# Patient Record
Sex: MALE | Race: WHITE | NOT HISPANIC OR LATINO
[De-identification: names, ages, dates, MRNs, and addresses within clinical notes are randomized per-mention and may not be internally consistent; named-entity substitution may affect disease eponyms.]

---

## 2019-07-08 PROBLEM — Z00.00 ENCOUNTER FOR PREVENTIVE HEALTH EXAMINATION: Status: ACTIVE | Noted: 2019-07-08

## 2019-07-09 ENCOUNTER — APPOINTMENT (OUTPATIENT)
Dept: INTERNAL MEDICINE | Facility: CLINIC | Age: 69
End: 2019-07-09
Payer: MEDICARE

## 2019-07-09 VITALS
HEIGHT: 69.5 IN | WEIGHT: 168 LBS | OXYGEN SATURATION: 98 % | BODY MASS INDEX: 24.32 KG/M2 | SYSTOLIC BLOOD PRESSURE: 100 MMHG | HEART RATE: 77 BPM | DIASTOLIC BLOOD PRESSURE: 70 MMHG

## 2019-07-09 DIAGNOSIS — Z87.891 PERSONAL HISTORY OF NICOTINE DEPENDENCE: ICD-10-CM

## 2019-07-09 PROCEDURE — 99203 OFFICE O/P NEW LOW 30 MIN: CPT

## 2019-07-09 RX ORDER — ASPIRIN 81 MG
81 TABLET, DELAYED RELEASE (ENTERIC COATED) ORAL
Refills: 0 | Status: ACTIVE | COMMUNITY

## 2019-07-09 NOTE — ASSESSMENT
[FreeTextEntry1] : Patient with advanced dementia. Patient had decrease in balance difficulty questionable vertigo versus CVA. Patient is to continue observation by his wife. She is to call me at the end of the week for followup. I will review recent labs done last night in the emergency room.

## 2019-07-09 NOTE — HISTORY OF PRESENT ILLNESS
[FreeTextEntry1] : New patient evaluation [de-identified] : This is a 69-year-old male with a history of dementia for at least 5 years. He is cared for by his wife. He is on multiple medications which were reviewed. Patient is oriented x1. He has occasional agitation. He is incontinent of urine. He recently had an admission to a hospital in [place] for acute onset of unsteadiness. A CAT scan was unremarkable. Patient did not tolerate an MRI. Therefore a diagnosis of vertigo versus CVA could not be defined. The patient is still having some trouble walking with balance difficulties. He has had occasional vomiting. He was discharged from the hospital several days ago. He was admitted last night to the emergency room with a bowel impaction. Patient is cared for by his wife at home.

## 2019-12-10 ENCOUNTER — APPOINTMENT (OUTPATIENT)
Dept: INTERNAL MEDICINE | Facility: CLINIC | Age: 69
End: 2019-12-10
Payer: MEDICARE

## 2019-12-10 VITALS
SYSTOLIC BLOOD PRESSURE: 100 MMHG | BODY MASS INDEX: 24.47 KG/M2 | HEART RATE: 78 BPM | HEIGHT: 69.5 IN | OXYGEN SATURATION: 100 % | WEIGHT: 169 LBS | DIASTOLIC BLOOD PRESSURE: 70 MMHG

## 2019-12-10 PROCEDURE — 99214 OFFICE O/P EST MOD 30 MIN: CPT | Mod: 25

## 2019-12-10 PROCEDURE — G0008: CPT

## 2019-12-10 PROCEDURE — 90686 IIV4 VACC NO PRSV 0.5 ML IM: CPT

## 2019-12-10 NOTE — HISTORY OF PRESENT ILLNESS
[FreeTextEntry1] : Followup history of dementia. [de-identified] : This is a 69-year-old male with advanced dementia. The etiology of the dementia is possibly frontotemporal dementia and not Alzheimer's disease. Patient's wife has changed his medical regimen which now includes trazodone 300 mg daily Seroquel 25 b.i.d., and Wellbutrin questionable dose. She stopped Namenda. She states the behavioral problem has gotten better since she has change of medication. He is off Lexapro. Seroquel seems to help him sleep. He is not overly sleepy during the day. He has an aide for 12 hours per day. He goes to  3 days a week. He is incontinent of urine which is foul-smelling. Patient has not received a flu shot.

## 2019-12-10 NOTE — REVIEW OF SYSTEMS
[Memory Loss] : memory loss [Anxiety] : anxiety [Negative] : Heme/Lymph [de-identified] : speech impairment, agitation

## 2019-12-10 NOTE — PHYSICAL EXAM
[No Acute Distress] : no acute distress [Well Nourished] : well nourished [Well Developed] : well developed [Well-Appearing] : well-appearing [Normal Sclera/Conjunctiva] : normal sclera/conjunctiva [PERRL] : pupils equal round and reactive to light [EOMI] : extraocular movements intact [Normal Outer Ear/Nose] : the outer ears and nose were normal in appearance [Normal Oropharynx] : the oropharynx was normal [No JVD] : no jugular venous distention [No Lymphadenopathy] : no lymphadenopathy [Supple] : supple [Thyroid Normal, No Nodules] : the thyroid was normal and there were no nodules present [No Respiratory Distress] : no respiratory distress  [No Accessory Muscle Use] : no accessory muscle use [Clear to Auscultation] : lungs were clear to auscultation bilaterally [Normal Rate] : normal rate  [Regular Rhythm] : with a regular rhythm [Normal S1, S2] : normal S1 and S2 [No Murmur] : no murmur heard [No Carotid Bruits] : no carotid bruits [No Abdominal Bruit] : a ~M bruit was not heard ~T in the abdomen [Pedal Pulses Present] : the pedal pulses are present [No Varicosities] : no varicosities [No Edema] : there was no peripheral edema [No Palpable Aorta] : no palpable aorta [No Extremity Clubbing/Cyanosis] : no extremity clubbing/cyanosis [Soft] : abdomen soft [No Masses] : no abdominal mass palpated [Non-distended] : non-distended [Non Tender] : non-tender [No HSM] : no HSM [Normal Bowel Sounds] : normal bowel sounds [No CVA Tenderness] : no CVA  tenderness [Normal Posterior Cervical Nodes] : no posterior cervical lymphadenopathy [Normal Anterior Cervical Nodes] : no anterior cervical lymphadenopathy [No Spinal Tenderness] : no spinal tenderness [No Joint Swelling] : no joint swelling [Grossly Normal Strength/Tone] : grossly normal strength/tone [No Rash] : no rash [No Focal Deficits] : no focal deficits [Coordination Grossly Intact] : coordination grossly intact [Normal Gait] : normal gait [Deep Tendon Reflexes (DTR)] : deep tendon reflexes were 2+ and symmetric [de-identified] : oriented x 1 .

## 2019-12-10 NOTE — ASSESSMENT
[FreeTextEntry1] : Patient with likely frontotemporal dementia. High dose of flu vaccine was given. Patient is not allowing us to draw bloods. Patient may return on a p.r.n. basis. Patient's wife is instructed to obtain a urine from the patient at home given the history of foul-smelling urine.

## 2019-12-27 ENCOUNTER — MEDICATION RENEWAL (OUTPATIENT)
Age: 69
End: 2019-12-27

## 2019-12-31 LAB — BACTERIA UR CULT: NORMAL

## 2020-03-13 ENCOUNTER — APPOINTMENT (OUTPATIENT)
Dept: INTERNAL MEDICINE | Facility: CLINIC | Age: 70
End: 2020-03-13
Payer: COMMERCIAL

## 2020-03-13 VITALS
WEIGHT: 169 LBS | SYSTOLIC BLOOD PRESSURE: 120 MMHG | HEIGHT: 69.5 IN | DIASTOLIC BLOOD PRESSURE: 80 MMHG | HEART RATE: 79 BPM | OXYGEN SATURATION: 97 % | BODY MASS INDEX: 24.47 KG/M2

## 2020-03-13 PROCEDURE — 99213 OFFICE O/P EST LOW 20 MIN: CPT

## 2020-03-13 NOTE — PHYSICAL EXAM
[No Acute Distress] : no acute distress [Well Nourished] : well nourished [Well Developed] : well developed [Well-Appearing] : well-appearing [Normal Sclera/Conjunctiva] : normal sclera/conjunctiva [PERRL] : pupils equal round and reactive to light [EOMI] : extraocular movements intact [Normal Outer Ear/Nose] : the outer ears and nose were normal in appearance [Normal Oropharynx] : the oropharynx was normal [No JVD] : no jugular venous distention [No Lymphadenopathy] : no lymphadenopathy [Supple] : supple [Thyroid Normal, No Nodules] : the thyroid was normal and there were no nodules present [No Respiratory Distress] : no respiratory distress  [No Accessory Muscle Use] : no accessory muscle use [Clear to Auscultation] : lungs were clear to auscultation bilaterally [Normal Rate] : normal rate  [Regular Rhythm] : with a regular rhythm [Normal S1, S2] : normal S1 and S2 [No Murmur] : no murmur heard [No Carotid Bruits] : no carotid bruits [No Abdominal Bruit] : a ~M bruit was not heard ~T in the abdomen [No Varicosities] : no varicosities [Pedal Pulses Present] : the pedal pulses are present [No Edema] : there was no peripheral edema [No Palpable Aorta] : no palpable aorta [No Extremity Clubbing/Cyanosis] : no extremity clubbing/cyanosis [Soft] : abdomen soft [Non Tender] : non-tender [Non-distended] : non-distended [No Masses] : no abdominal mass palpated [No HSM] : no HSM [Normal Bowel Sounds] : normal bowel sounds [Normal Posterior Cervical Nodes] : no posterior cervical lymphadenopathy [Normal Anterior Cervical Nodes] : no anterior cervical lymphadenopathy [No CVA Tenderness] : no CVA  tenderness [No Spinal Tenderness] : no spinal tenderness [No Joint Swelling] : no joint swelling [Grossly Normal Strength/Tone] : grossly normal strength/tone [No Rash] : no rash [Coordination Grossly Intact] : coordination grossly intact [No Focal Deficits] : no focal deficits [Deep Tendon Reflexes (DTR)] : deep tendon reflexes were 2+ and symmetric [de-identified] : oriented x 1 .

## 2020-03-13 NOTE — ASSESSMENT
[FreeTextEntry1] : Patient with advanced dementia and recent hospitalization for fever. The etiology of the fever was not documented. The patient remains afebrile and is improving since he's been at home the past 3 days. Patient's wife will continue current therapy including having an aide 12 hours per day.

## 2020-03-13 NOTE — HISTORY OF PRESENT ILLNESS
[FreeTextEntry1] : Recent hospital followup [de-identified] : Patient was hospitalized 10 days ago for a period of one week before the fever, constipation and increased rigidity. The source of the fever was never documented. He was on IV antibiotics for 48 hours. Since he's been home he is somewhat improving with no fever. His rigidity is better. His behavior has been improved since home. He still is a bit weaker than usual. He is currently on senna tablets 2 tablets at night and Colace. His other medications remain unchanged. He currently is afebrile at 98.3.

## 2020-06-05 ENCOUNTER — APPOINTMENT (OUTPATIENT)
Dept: INTERNAL MEDICINE | Facility: CLINIC | Age: 70
End: 2020-06-05

## 2020-06-10 RX ORDER — ASPIRIN 81 MG/1
81 TABLET, CHEWABLE ORAL
Qty: 30 | Refills: 0 | Status: ACTIVE | COMMUNITY
Start: 2019-06-24

## 2020-06-10 RX ORDER — DOCUSATE SODIUM 10 MG/ML
150 LIQUID ORAL
Qty: 150 | Refills: 0 | Status: ACTIVE | COMMUNITY
Start: 2020-02-25

## 2020-06-10 RX ORDER — SENNA 417.12 MG/237ML
8.8 SYRUP ORAL
Qty: 300 | Refills: 0 | Status: ACTIVE | COMMUNITY
Start: 2020-02-25

## 2021-03-08 ENCOUNTER — TRANSCRIPTION ENCOUNTER (OUTPATIENT)
Age: 71
End: 2021-03-08

## 2021-06-02 ENCOUNTER — APPOINTMENT (OUTPATIENT)
Dept: INTERNAL MEDICINE | Facility: CLINIC | Age: 71
End: 2021-06-02

## 2021-06-09 ENCOUNTER — APPOINTMENT (OUTPATIENT)
Dept: INTERNAL MEDICINE | Facility: CLINIC | Age: 71
End: 2021-06-09
Payer: MEDICARE

## 2021-06-09 PROCEDURE — 99446 NTRPROF PH1/NTRNET/EHR 5-10: CPT

## 2021-12-03 ENCOUNTER — APPOINTMENT (OUTPATIENT)
Dept: INTERNAL MEDICINE | Facility: CLINIC | Age: 71
End: 2021-12-03

## 2022-11-17 ENCOUNTER — TRANSCRIPTION ENCOUNTER (OUTPATIENT)
Age: 72
End: 2022-11-17

## 2022-11-26 ENCOUNTER — TRANSCRIPTION ENCOUNTER (OUTPATIENT)
Age: 72
End: 2022-11-26

## 2023-01-30 ENCOUNTER — TRANSCRIPTION ENCOUNTER (OUTPATIENT)
Age: 73
End: 2023-01-30

## 2023-01-31 ENCOUNTER — TRANSCRIPTION ENCOUNTER (OUTPATIENT)
Age: 73
End: 2023-01-31

## 2023-02-15 ENCOUNTER — TRANSCRIPTION ENCOUNTER (OUTPATIENT)
Age: 73
End: 2023-02-15

## 2023-02-27 ENCOUNTER — TRANSCRIPTION ENCOUNTER (OUTPATIENT)
Age: 73
End: 2023-02-27

## 2023-03-02 ENCOUNTER — TRANSCRIPTION ENCOUNTER (OUTPATIENT)
Age: 73
End: 2023-03-02

## 2023-03-04 ENCOUNTER — TRANSCRIPTION ENCOUNTER (OUTPATIENT)
Age: 73
End: 2023-03-04

## 2023-03-06 ENCOUNTER — TRANSCRIPTION ENCOUNTER (OUTPATIENT)
Age: 73
End: 2023-03-06

## 2023-05-25 ENCOUNTER — APPOINTMENT (OUTPATIENT)
Dept: GERIATRICS | Facility: HOME HEALTH | Age: 73
End: 2023-05-25
Payer: MEDICARE

## 2023-05-25 VITALS
TEMPERATURE: 96.8 F | OXYGEN SATURATION: 97 % | HEART RATE: 70 BPM | DIASTOLIC BLOOD PRESSURE: 80 MMHG | SYSTOLIC BLOOD PRESSURE: 120 MMHG

## 2023-05-25 DIAGNOSIS — F45.8 OTHER SOMATOFORM DISORDERS: ICD-10-CM

## 2023-05-25 DIAGNOSIS — F32.A DEPRESSION, UNSPECIFIED: ICD-10-CM

## 2023-05-25 DIAGNOSIS — R35.0 FREQUENCY OF MICTURITION: ICD-10-CM

## 2023-05-25 PROCEDURE — 99344 HOME/RES VST NEW MOD MDM 60: CPT

## 2023-05-25 RX ORDER — ATORVASTATIN CALCIUM 10 MG/1
10 TABLET, FILM COATED ORAL DAILY
Qty: 90 | Refills: 0 | Status: DISCONTINUED | COMMUNITY
End: 2023-05-25

## 2023-05-25 RX ORDER — BUPROPION HYDROCHLORIDE 150 MG/1
150 TABLET, FILM COATED ORAL
Refills: 0 | Status: DISCONTINUED | COMMUNITY
End: 2023-05-25

## 2023-05-25 RX ORDER — MEMANTINE HYDROCHLORIDE 10 MG/1
10 TABLET ORAL TWICE DAILY
Refills: 0 | Status: DISCONTINUED | COMMUNITY
End: 2023-05-25

## 2023-05-25 RX ORDER — ESCITALOPRAM OXALATE 10 MG/1
10 TABLET, FILM COATED ORAL DAILY
Qty: 90 | Refills: 2 | Status: DISCONTINUED | COMMUNITY
End: 2023-05-25

## 2023-05-31 NOTE — REVIEW OF SYSTEMS
[Negative] : Heme/Lymph [Shortness Of Breath] : no shortness of breath [Wheezing] : no wheezing [Cough] : no cough [FreeTextEntry2] : teeth grinding [FreeTextEntry4] : teeth gridning - chewing [FreeTextEntry8] : uses condom catheters

## 2023-05-31 NOTE — ASSESSMENT
[FreeTextEntry1] : uses condom catheters medicallly necessary bc of incontinence\par uses one a day\par drainage bags - one a week/ 4 a month\par permanent urinary retention\par \par pt has advanced dementia\par is treated with antidepressants\par \par had recent labs at [facility]\par \par wife is     health care proxy\par no home DNR\par \par will get MBS as requested\par \par addendum\par gets two drainage bags and 30 condom catheters a month

## 2023-05-31 NOTE — HISTORY OF PRESENT ILLNESS
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

## 2023-05-31 NOTE — PHYSICAL EXAM
[Alert] : alert [Well Nourished] : well nourished [Healthy Appearing] : healthy appearing [No Acute Distress] : in no acute distress [Well Developed] : well developed [Normal] : the appearance was normal, neck was supple [No Respiratory Distress] : no respiratory distress [No Acc Muscle Use] : no accessory muscle use [Respiration, Rhythm And Depth] : normal respiratory rhythm and effort [Auscultation Breath Sounds / Voice Sounds] : lungs were clear to auscultation bilaterally [Normal S1, S2] : normal S1 and S2 [Murmurs] : no murmurs [Edema] : edema was not present [Abdomen Tenderness] : non-tender [Abdomen Soft] : soft [de-identified] : nonverbal [de-identified] : grinding teeth - able to eat - spoon fed [de-identified] : rossy - nonbverbal

## 2023-06-01 ENCOUNTER — APPOINTMENT (OUTPATIENT)
Dept: GERIATRICS | Facility: HOME HEALTH | Age: 73
End: 2023-06-01

## 2023-07-27 ENCOUNTER — APPOINTMENT (OUTPATIENT)
Dept: GERIATRICS | Facility: HOME HEALTH | Age: 73
End: 2023-07-27
Payer: MEDICARE

## 2023-07-27 VITALS
HEART RATE: 61 BPM | OXYGEN SATURATION: 99 % | DIASTOLIC BLOOD PRESSURE: 70 MMHG | TEMPERATURE: 96.4 F | SYSTOLIC BLOOD PRESSURE: 105 MMHG

## 2023-07-27 PROCEDURE — 99349 HOME/RES VST EST MOD MDM 40: CPT

## 2023-07-27 NOTE — ASSESSMENT
[FreeTextEntry1] : uses condom catheters medicallly necessary bc of incontinence\par uses one a day\par drainage bags - one a week/ 4 a month\par permanent urinary retention\par \par pt has advanced dementia\par is treated with antidepressants\par \par had recent labs at [facility]\par \par wife is     health care proxy\par no home DNR\par \par will get MBS as requested\par \par addendum\par gets two drainage bags and 30 condom catheters a month\par \par 7/26/23\par pt has well healed scar on rt calf\par vss\par cont current meds\par had dtp\par ordered labs\par

## 2023-07-27 NOTE — PHYSICAL EXAM
[Alert] : alert [Well Nourished] : well nourished [Healthy Appearing] : healthy appearing [Well Developed] : well developed [Normal] : the appearance was normal, neck was supple [No Respiratory Distress] : no respiratory distress [No Acc Muscle Use] : no accessory muscle use [Respiration, Rhythm And Depth] : normal respiratory rhythm and effort [Auscultation Breath Sounds / Voice Sounds] : lungs were clear to auscultation bilaterally [Normal S1, S2] : normal S1 and S2 [Murmurs] : no murmurs [Edema] : edema was not present [Abdomen Tenderness] : non-tender [Abdomen Soft] : soft [de-identified] : nonverbal [de-identified] : grinding teeth - able to eat - spoon fed [de-identified] : rt calflarge well healed scar [de-identified] : rossy - nonbverbal

## 2023-07-27 NOTE — HISTORY OF PRESENT ILLNESS
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

## 2023-07-28 ENCOUNTER — LABORATORY RESULT (OUTPATIENT)
Age: 73
End: 2023-07-28

## 2023-07-31 ENCOUNTER — LABORATORY RESULT (OUTPATIENT)
Age: 73
End: 2023-07-31

## 2023-08-01 ENCOUNTER — LABORATORY RESULT (OUTPATIENT)
Age: 73
End: 2023-08-01

## 2023-09-14 ENCOUNTER — RESULT REVIEW (OUTPATIENT)
Age: 73
End: 2023-09-14

## 2023-09-29 DIAGNOSIS — H00.016 HORDEOLUM EXTERNUM LEFT EYE, UNSPECIFIED EYELID: ICD-10-CM

## 2023-10-18 ENCOUNTER — APPOINTMENT (OUTPATIENT)
Dept: GERIATRICS | Facility: HOME HEALTH | Age: 73
End: 2023-10-18
Payer: MEDICARE

## 2023-10-18 VITALS
TEMPERATURE: 97.5 F | HEART RATE: 69 BPM | OXYGEN SATURATION: 98 % | DIASTOLIC BLOOD PRESSURE: 70 MMHG | SYSTOLIC BLOOD PRESSURE: 110 MMHG

## 2023-10-18 DIAGNOSIS — Z23 ENCOUNTER FOR IMMUNIZATION: ICD-10-CM

## 2023-10-18 DIAGNOSIS — H10.9 UNSPECIFIED CONJUNCTIVITIS: ICD-10-CM

## 2023-10-18 PROCEDURE — 90662 IIV NO PRSV INCREASED AG IM: CPT

## 2023-10-18 PROCEDURE — 99349 HOME/RES VST EST MOD MDM 40: CPT

## 2023-10-18 PROCEDURE — G0008: CPT

## 2023-10-18 RX ORDER — BUPROPION HYDROCHLORIDE 75 MG/1
75 TABLET, FILM COATED ORAL
Qty: 180 | Refills: 3 | Status: ACTIVE | COMMUNITY
Start: 2019-06-24 | End: 1900-01-01

## 2023-10-25 PROBLEM — Z23 ENCOUNTER FOR IMMUNIZATION: Status: ACTIVE | Noted: 2023-10-25 | Resolved: 2023-11-08

## 2023-11-14 ENCOUNTER — TRANSCRIPTION ENCOUNTER (OUTPATIENT)
Age: 73
End: 2023-11-14

## 2023-11-17 ENCOUNTER — TRANSCRIPTION ENCOUNTER (OUTPATIENT)
Age: 73
End: 2023-11-17

## 2023-11-19 ENCOUNTER — NON-APPOINTMENT (OUTPATIENT)
Age: 73
End: 2023-11-19

## 2023-11-22 ENCOUNTER — APPOINTMENT (OUTPATIENT)
Dept: GERIATRICS | Facility: CLINIC | Age: 73
End: 2023-11-22
Payer: MEDICARE

## 2023-11-22 DIAGNOSIS — L89.313 PRESSURE ULCER OF RIGHT BUTTOCK, STAGE 3: ICD-10-CM

## 2023-11-22 PROCEDURE — 99214 OFFICE O/P EST MOD 30 MIN: CPT | Mod: 95

## 2023-11-27 PROBLEM — L89.313 PRESSURE INJURY OF RIGHT BUTTOCK, STAGE 3: Status: ACTIVE | Noted: 2023-11-27

## 2023-11-30 ENCOUNTER — RESULT REVIEW (OUTPATIENT)
Age: 73
End: 2023-11-30

## 2023-12-08 ENCOUNTER — TRANSCRIPTION ENCOUNTER (OUTPATIENT)
Age: 73
End: 2023-12-08

## 2023-12-11 RX ORDER — NYSTATIN 100000 1/G
100000 POWDER TOPICAL
Qty: 60 | Refills: 5 | Status: ACTIVE | COMMUNITY
Start: 2023-12-11 | End: 1900-01-01

## 2023-12-14 ENCOUNTER — APPOINTMENT (OUTPATIENT)
Dept: GERIATRICS | Facility: HOME HEALTH | Age: 73
End: 2023-12-14
Payer: MEDICARE

## 2023-12-14 VITALS
DIASTOLIC BLOOD PRESSURE: 65 MMHG | SYSTOLIC BLOOD PRESSURE: 120 MMHG | TEMPERATURE: 100 F | OXYGEN SATURATION: 96 % | HEART RATE: 80 BPM

## 2023-12-14 DIAGNOSIS — R06.2 WHEEZING: ICD-10-CM

## 2023-12-14 DIAGNOSIS — L89.322 PRESSURE ULCER OF LEFT BUTTOCK, STAGE 2: ICD-10-CM

## 2023-12-14 DIAGNOSIS — B49 UNSPECIFIED MYCOSIS: ICD-10-CM

## 2023-12-14 DIAGNOSIS — R13.12 DYSPHAGIA, OROPHARYNGEAL PHASE: ICD-10-CM

## 2023-12-14 DIAGNOSIS — R50.9 FEVER, UNSPECIFIED: ICD-10-CM

## 2023-12-14 PROCEDURE — 99496 TRANSJ CARE MGMT HIGH F2F 7D: CPT

## 2023-12-14 PROCEDURE — 99349 HOME/RES VST EST MOD MDM 40: CPT

## 2023-12-14 NOTE — ASSESSMENT
[FreeTextEntry1] : pt with advanced dementia nonverbal will given flu vaccine left renew meds swallow ok with precautions cipro for eye wife declines covid vaccine had j and j  12/14/23 low grade temp today covid neg using vest vitals are good by afternoon was 98.3 no sign of new infection - lungs clear  fungal rash clearing decub healing

## 2023-12-14 NOTE — PHYSICAL EXAM
[Alert] : alert [Well Nourished] : well nourished [Well Developed] : well developed [No Respiratory Distress] : no respiratory distress [No Acc Muscle Use] : no accessory muscle use [Respiration, Rhythm And Depth] : normal respiratory rhythm and effort [Auscultation Breath Sounds / Voice Sounds] : lungs were clear to auscultation bilaterally [Normal S1, S2] : normal S1 and S2 [Murmurs] : no murmurs [Heart Rate And Rhythm] : heart rate was normal and rhythm regular [No Rubs] : no pericardial rub [Edema] : edema was not present [Bowel Sounds] : normal bowel sounds [Abdomen Tenderness] : non-tender [Abdomen Soft] : soft [Penis Abnormality] : the penis was normal [Testes Swelling] : the testicles were not swollen [Normal] : no spinal tenderness [de-identified] : not coughing [de-identified] : small stage 2 on butt [de-identified] : condom catheter. - faint redness in groin area [de-identified] : bed [de-identified] : rt buttocks stage 2 - 2 by 2 cm, scar on left hip is well healed [de-identified] : nonverbal

## 2023-12-14 NOTE — HISTORY OF PRESENT ILLNESS
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  admitted in nov with fall - 6 broken ribs - pneumo and hemo thorax then in march  was admitted  twice with utis  got j and j vaccine has had covid  has not had pneumonia vaccines - wife reluctant will gwt flu vaccines  7/27/23 pt scraped leg on wheel chair on 7/4/23 taken to [facility] had stitched - self dissolved  would looks goood got dtp  did not get MBS  eats meals on wheels cut up food  10/18/23 pt has been well has conjunctivitis /sty left eye ointment hard to get in trying drops did not  yet had swallow study using provale cup   11/22/23 video  visit with the pt and his wife. pt has [facility] care medicare and medicaid. PT is nonverbal because of advanced dementia. He is completely dependent on his aides and wife for al ADLs Pt was admitted to hospital with fractured hip - and has been pinned. Apparently the aide left him unattended and he fell out of a  chair. In hospital found to have urosepsis and now has a  line and is receiving IV ertapenan at home. He is also receiving lovenox injections for DVT prophylaxis. pt is in hospital bed - he is immobile, incontinent and unable to reposition himself. He needs to have head of bed elevated 30 degrees to prevent aspiratation. due to immobiity he has developed multiple stage 2-3 decubiti on buttocks region  12/14/23 pt is s/p hospitalization for pneumonia he is fininished with antibiotics Angela Kidd called within 48 hours of dc and reviewed medications and dc plans  pt is seen at home with wife  and two aides - he is in NAD wrapped in a lot of blankets and sweaty initially temp 101 then 100 he is not coughing  he now has a vest to stimulate secretion mobilization to wear at times pt is nonverbal skin is better - stage 1 butt - small stage 2 wife  purchased an air mattress overlay. he has also used nystop for fungal rash - much bettter

## 2023-12-14 NOTE — REVIEW OF SYSTEMS
[Fever] : fever [Incontinence] : incontinence [Skin Wound] : skin wound [Confused] : confusion [Limb Weakness] : limb weakness [Difficulty Walking] : difficulty walking [Anxiety] : anxiety [Negative] : Heme/Lymph [de-identified] : sweating

## 2023-12-21 PROBLEM — R06.2 WHEEZING: Status: ACTIVE | Noted: 2023-12-21

## 2023-12-21 RX ORDER — ALBUTEROL SULFATE 2.5 MG/3ML
(2.5 MG/3ML) SOLUTION RESPIRATORY (INHALATION)
Qty: 3 | Refills: 5 | Status: ACTIVE | COMMUNITY
Start: 2023-12-21 | End: 1900-01-01

## 2024-01-17 ENCOUNTER — APPOINTMENT (OUTPATIENT)
Dept: GERIATRICS | Facility: HOME HEALTH | Age: 74
End: 2024-01-17
Payer: MEDICARE

## 2024-01-17 DIAGNOSIS — S81.801S UNSPECIFIED OPEN WOUND, RIGHT LOWER LEG, SEQUELA: ICD-10-CM

## 2024-01-17 PROCEDURE — 99348 HOME/RES VST EST LOW MDM 30: CPT

## 2024-01-18 VITALS
OXYGEN SATURATION: 93 % | HEART RATE: 62 BPM | TEMPERATURE: 98.8 F | DIASTOLIC BLOOD PRESSURE: 70 MMHG | SYSTOLIC BLOOD PRESSURE: 110 MMHG

## 2024-01-18 PROBLEM — S81.801S LEG WOUND, RIGHT, SEQUELA: Status: ACTIVE | Noted: 2023-07-27

## 2024-01-18 NOTE — HISTORY OF PRESENT ILLNESS
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  admitted in nov with fall - 6 broken ribs - pneumo and hemo thorax then in march  was admitted  twice with utis  got j and j vaccine has had covid  has not had pneumonia vaccines - wife reluctant will gwt flu vaccines  7/27/23 pt scraped leg on wheel chair on 7/4/23 taken to [facility] had stitched - self dissolved  would looks goood got dtp  did not get MBS  eats meals on wheels cut up food  10/18/23 pt has been well has conjunctivitis /sty left eye ointment hard to get in trying drops did not  yet had swallow study using provale cup   11/22/23 video  visit with the pt and his wife. pt has [facility] care medicare and medicaid. PT is nonverbal because of advanced dementia. He is completely dependent on his aides and wife for al ADLs Pt was admitted to hospital with fractured hip - and has been pinned. Apparently the aide left him unattended and he fell out of a  chair. In hospital found to have urosepsis and now has a  line and is receiving IV ertapenan at home. He is also receiving lovenox injections for DVT prophylaxis. pt is in hospital bed - he is immobile, incontinent and unable to reposition himself. He needs to have head of bed elevated 30 degrees to prevent aspiratation. due to immobiity he has developed multiple stage 2-3 decubiti on buttocks region  12/14/23 pt is s/p hospitalization for pneumonia he is fininished with antibiotics Angela Kidd called within 48 hours of dc and reviewed medications and dc plans  pt is seen at home with wife  and two aides - he is in NAD wrapped in a lot of blankets and sweaty initially temp 101 then 100 he is not coughing  he now has a vest to stimulate secretion mobilization to wear at times pt is nonverbal skin is better - stage 1 butt - small stage 2 wife  purchased an air mattress overlay. he has also used nystop for fungal rash - much bettter  1/17/24 pt has been well has PT has VNS has all supplies and meds now skin clear

## 2024-01-18 NOTE — PHYSICAL EXAM
[Alert] : alert [Well Nourished] : well nourished [Well Developed] : well developed [No Respiratory Distress] : no respiratory distress [No Acc Muscle Use] : no accessory muscle use [Respiration, Rhythm And Depth] : normal respiratory rhythm and effort [Auscultation Breath Sounds / Voice Sounds] : lungs were clear to auscultation bilaterally [Normal S1, S2] : normal S1 and S2 [Murmurs] : no murmurs [Heart Rate And Rhythm] : heart rate was normal and rhythm regular [No Rubs] : no pericardial rub [Edema] : edema was not present [Bowel Sounds] : normal bowel sounds [Abdomen Tenderness] : non-tender [Abdomen Soft] : soft [Penis Abnormality] : the penis was normal [Testes Swelling] : the testicles were not swollen [Normal] : no spinal tenderness [No Focal Deficits] : no focal deficits [de-identified] : not coughing, awake - not responsive [de-identified] : clear [de-identified] : condom catheter. - clear [de-identified] : bed [de-identified] : rt buttocks clear [de-identified] : nonverbal

## 2024-01-18 NOTE — REVIEW OF SYSTEMS
[Incontinence] : incontinence [Arthralgias] : arthralgias [Joint Stiffness] : joint stiffness [Change In Personality] : personality change [Negative] : Heme/Lymph [FreeTextEntry9] : prob putting left leg down - not back to baseline

## 2024-01-18 NOTE — ASSESSMENT
[FreeTextEntry1] : pt with advanced dementia nonverbal will given flu vaccine left renew meds swallow ok with precautions cipro for eye wife declines covid vaccine had j and j  1/17/24 pt recovering from hip fracture no fever getting PT has advanced dementia doing well

## 2024-01-30 RX ORDER — IPRATROPIUM BROMIDE AND ALBUTEROL SULFATE 2.5; .5 MG/3ML; MG/3ML
0.5-2.5 (3) SOLUTION RESPIRATORY (INHALATION)
Qty: 1080 | Refills: 3 | Status: ACTIVE | COMMUNITY
Start: 2024-01-30 | End: 1900-01-01

## 2024-03-15 RX ORDER — CIPROFLOXACIN 3 MG/ML
0.3 SOLUTION OPHTHALMIC 3 TIMES DAILY
Qty: 1 | Refills: 3 | Status: ACTIVE | COMMUNITY
Start: 2023-10-17 | End: 1900-01-01

## 2024-03-15 RX ORDER — NEOMYCIN AND POLYMYXIN B SULFATES AND DEXAMETHASONE 3.5; 10000; 1 MG/G; [IU]/G; MG/G
3.5-10000-0.1 OINTMENT OPHTHALMIC 3 TIMES DAILY
Qty: 1 | Refills: 1 | Status: ACTIVE | COMMUNITY
Start: 2023-09-29 | End: 1900-01-01

## 2024-04-17 ENCOUNTER — APPOINTMENT (OUTPATIENT)
Dept: GERIATRICS | Facility: HOME HEALTH | Age: 74
End: 2024-04-17
Payer: MEDICARE

## 2024-04-17 VITALS
BODY MASS INDEX: 23.73 KG/M2 | WEIGHT: 163 LBS | TEMPERATURE: 96.2 F | HEART RATE: 55 BPM | DIASTOLIC BLOOD PRESSURE: 65 MMHG | OXYGEN SATURATION: 100 % | SYSTOLIC BLOOD PRESSURE: 100 MMHG

## 2024-04-17 DIAGNOSIS — N39.9 DISORDER OF URINARY SYSTEM, UNSPECIFIED: ICD-10-CM

## 2024-04-17 DIAGNOSIS — N39.42 INCONTINENCE W/OUT SENSORY AWARENESS: ICD-10-CM

## 2024-04-17 DIAGNOSIS — F03.90 UNSPECIFIED DEMENTIA W/OUT BEHAVIORAL DISTURBANCE: ICD-10-CM

## 2024-04-17 PROCEDURE — 99349 HOME/RES VST EST MOD MDM 40: CPT

## 2024-04-17 NOTE — ASSESSMENT
[FreeTextEntry1] : pt with advanced dementia nonverbal will given flu vaccine left renew meds swallow ok with precautions cipro for eye wife declines covid vaccine had j and j  4/17/24 pt is doing well he has urinary incontinence permanent uses [place] condom catheters and drainage bags sending in scripts for [facility] has all meds

## 2024-04-17 NOTE — REVIEW OF SYSTEMS
[Wheezing] : wheezing [Incontinence] : incontinence [Limb Weakness] : limb weakness [Difficulty Walking] : difficulty walking [Negative] : Integumentary [FreeTextEntry8] : 018

## 2024-04-17 NOTE — HISTORY OF PRESENT ILLNESS
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  admitted in nov with fall - 6 broken ribs - pneumo and hemo thorax then in march  was admitted  twice with utis  got j and j vaccine has had covid  has not had pneumonia vaccines - wife reluctant will gwt flu vaccines  7/27/23 pt scraped leg on wheel chair on 7/4/23 taken to [facility] had stitched - self dissolved  would looks goood got dtp  did not get MBS  eats meals on wheels cut up food  10/18/23 pt has been well has conjunctivitis /sty left eye ointment hard to get in trying drops did not  yet had swallow study using provale cup   11/22/23 video  visit with the pt and his wife. pt has [facility] care medicare and medicaid. PT is nonverbal because of advanced dementia. He is completely dependent on his aides and wife for al ADLs Pt was admitted to hospital with fractured hip - and has been pinned. Apparently the aide left him unattended and he fell out of a  chair. In hospital found to have urosepsis and now has a  line and is receiving IV ertapenan at home. He is also receiving lovenox injections for DVT prophylaxis. pt is in hospital bed - he is immobile, incontinent and unable to reposition himself. He needs to have head of bed elevated 30 degrees to prevent aspiratation. due to immobiity he has developed multiple stage 2-3 decubiti on buttocks region  12/14/23 pt is s/p hospitalization for pneumonia he is fininished with antibiotics Angela Kidd called within 48 hours of dc and reviewed medications and dc plans  pt is seen at home with wife  and two aides - he is in NAD wrapped in a lot of blankets and sweaty initially temp 101 then 100 he is not coughing  he now has a vest to stimulate secretion mobilization to wear at times pt is nonverbal skin is better - stage 1 butt - small stage 2 wife  purchased an air mattress overlay. he has also used nystop for fungal rash - much bettter  1/17/24 pt has been well has PT has VNS has all supplies and meds now skin clear  4/17/24 pt seen at home today doing well up and eating in kitchen  pt uses condom catheters in order to urinate prone to UTIs and at risk for skin infections citlalli chavez [place] catheter and drainage bgs 4 drainage bags and 31 condom catheters a month

## 2024-04-17 NOTE — PHYSICAL EXAM
[Alert] : alert [Well Nourished] : well nourished [Well Developed] : well developed [No Respiratory Distress] : no respiratory distress [No Acc Muscle Use] : no accessory muscle use [Respiration, Rhythm And Depth] : normal respiratory rhythm and effort [Auscultation Breath Sounds / Voice Sounds] : lungs were clear to auscultation bilaterally [Normal S1, S2] : normal S1 and S2 [Murmurs] : no murmurs [Heart Rate And Rhythm] : heart rate was normal and rhythm regular [No Rubs] : no pericardial rub [Edema] : edema was not present [Abdomen Tenderness] : non-tender [Bowel Sounds] : normal bowel sounds [Abdomen Soft] : soft [Normal] : no spinal tenderness [No Focal Deficits] : no focal deficits [de-identified] : pt is awake at table, nonverbal - NAD [de-identified] : clear by report [de-identified] : bed [de-identified] : rt buttocks clear [de-identified] : nonverbal

## 2024-04-26 RX ORDER — CATHETER 1"
EACH MISCELLANEOUS
Qty: 1 | Refills: 0 | Status: ACTIVE | OUTPATIENT
Start: 2024-04-17

## 2024-05-08 DIAGNOSIS — R05.1 ACUTE COUGH: ICD-10-CM

## 2024-05-28 ENCOUNTER — RX RENEWAL (OUTPATIENT)
Age: 74
End: 2024-05-28

## 2024-05-28 RX ORDER — TRAZODONE HYDROCHLORIDE 100 MG/1
100 TABLET ORAL
Qty: 90 | Refills: 3 | Status: ACTIVE | COMMUNITY
Start: 2023-05-25 | End: 1900-01-01

## 2024-06-03 DIAGNOSIS — R21 RASH AND OTHER NONSPECIFIC SKIN ERUPTION: ICD-10-CM

## 2024-06-03 RX ORDER — MUPIROCIN 20 MG/G
2 OINTMENT TOPICAL 3 TIMES DAILY
Qty: 1 | Refills: 5 | Status: ACTIVE | COMMUNITY
Start: 2024-06-03 | End: 1900-01-01

## 2024-06-17 DIAGNOSIS — L02.212 CUTANEOUS ABSCESS OF BACK [ANY PART, EXCEPT BUTTOCK]: ICD-10-CM

## 2024-06-17 RX ORDER — CEPHALEXIN 500 MG/1
500 TABLET ORAL 3 TIMES DAILY
Qty: 30 | Refills: 0 | Status: ACTIVE | COMMUNITY
Start: 2024-06-17 | End: 1900-01-01

## 2024-06-26 ENCOUNTER — APPOINTMENT (OUTPATIENT)
Dept: GERIATRICS | Facility: HOME HEALTH | Age: 74
End: 2024-06-26
Payer: MEDICARE

## 2024-06-26 VITALS — HEART RATE: 73 BPM | SYSTOLIC BLOOD PRESSURE: 120 MMHG | OXYGEN SATURATION: 91 % | DIASTOLIC BLOOD PRESSURE: 80 MMHG

## 2024-06-26 DIAGNOSIS — L72.3 SEBACEOUS CYST: ICD-10-CM

## 2024-06-26 DIAGNOSIS — L08.9 SEBACEOUS CYST: ICD-10-CM

## 2024-06-26 PROCEDURE — 99349 HOME/RES VST EST MOD MDM 40: CPT

## 2024-06-26 RX ORDER — AMOXICILLIN AND CLAVULANATE POTASSIUM 875; 125 MG/1; MG/1
875-125 TABLET, COATED ORAL TWICE DAILY
Qty: 20 | Refills: 0 | Status: DISCONTINUED | COMMUNITY
Start: 2024-05-08 | End: 2024-06-26

## 2024-09-04 ENCOUNTER — APPOINTMENT (OUTPATIENT)
Dept: GERIATRICS | Facility: HOME HEALTH | Age: 74
End: 2024-09-04
Payer: MEDICARE

## 2024-09-04 VITALS
HEART RATE: 69 BPM | SYSTOLIC BLOOD PRESSURE: 115 MMHG | OXYGEN SATURATION: 98 % | TEMPERATURE: 96.7 F | DIASTOLIC BLOOD PRESSURE: 80 MMHG

## 2024-09-04 DIAGNOSIS — F03.90 UNSPECIFIED DEMENTIA W/OUT BEHAVIORAL DISTURBANCE: ICD-10-CM

## 2024-09-04 DIAGNOSIS — N39.42 INCONTINENCE W/OUT SENSORY AWARENESS: ICD-10-CM

## 2024-09-04 DIAGNOSIS — Z23 ENCOUNTER FOR IMMUNIZATION: ICD-10-CM

## 2024-09-04 DIAGNOSIS — C44.529 SQUAMOUS CELL CARCINOMA OF SKIN OF OTHER PART OF TRUNK: ICD-10-CM

## 2024-09-04 PROCEDURE — G0008: CPT

## 2024-09-04 PROCEDURE — 99349 HOME/RES VST EST MOD MDM 40: CPT

## 2024-09-04 PROCEDURE — 90662 IIV NO PRSV INCREASED AG IM: CPT

## 2024-09-04 NOTE — REVIEW OF SYSTEMS
[Cough] : cough [Incontinence] : incontinence [Skin Lesions] : skin lesion [Difficulty Walking] : difficulty walking [Negative] : Heme/Lymph [Fever] : no fever [FreeTextEntry3] : red spot on eye lid [FreeTextEntry8] : condom cath [de-identified] : back

## 2024-09-04 NOTE — PHYSICAL EXAM
[Alert] : alert [Well Nourished] : well nourished [Well Developed] : well developed [Normal Lips/Gums] : the lips and gums were normal [No Respiratory Distress] : no respiratory distress [No Acc Muscle Use] : no accessory muscle use [Respiration, Rhythm And Depth] : normal respiratory rhythm and effort [Auscultation Breath Sounds / Voice Sounds] : lungs were clear to auscultation bilaterally [Normal S1, S2] : normal S1 and S2 [Murmurs] : no murmurs [Heart Rate And Rhythm] : heart rate was normal and rhythm regular [No Rubs] : no pericardial rub [Edema] : edema was not present [Bowel Sounds] : normal bowel sounds [Abdomen Tenderness] : non-tender [Abdomen Soft] : soft [Penis Abnormality] : the penis was normal [Normal] : no spinal tenderness [No Focal Deficits] : no focal deficits [de-identified] : pt is awake at table, nonverbal - NAD- smiling at video of wife - pointing to phone [de-identified] : clear skin [de-identified] : rt buttocks clear- -- on back large lesion 5 cm round - larger and indurated - no surrpunding errythema - photo taken [de-identified] : nonverbal

## 2024-09-04 NOTE — ASSESSMENT
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

## 2024-09-11 PROBLEM — Z23 ENCOUNTER FOR IMMUNIZATION: Status: ACTIVE | Noted: 2023-10-25 | Resolved: 2024-09-25

## 2024-11-13 ENCOUNTER — APPOINTMENT (OUTPATIENT)
Dept: GERIATRICS | Facility: HOME HEALTH | Age: 74
End: 2024-11-13
Payer: MEDICARE

## 2024-11-13 ENCOUNTER — APPOINTMENT (OUTPATIENT)
Age: 74
End: 2024-11-13

## 2024-11-13 VITALS
SYSTOLIC BLOOD PRESSURE: 115 MMHG | DIASTOLIC BLOOD PRESSURE: 80 MMHG | OXYGEN SATURATION: 99 % | HEART RATE: 69 BPM | TEMPERATURE: 97.6 F

## 2024-11-13 DIAGNOSIS — F03.90 UNSPECIFIED DEMENTIA W/OUT BEHAVIORAL DISTURBANCE: ICD-10-CM

## 2024-11-13 DIAGNOSIS — C43.59 MALIGNANT MELANOMA OF OTHER PART OF TRUNK: ICD-10-CM

## 2024-11-13 DIAGNOSIS — C44.529 SQUAMOUS CELL CARCINOMA OF SKIN OF OTHER PART OF TRUNK: ICD-10-CM

## 2024-11-13 PROCEDURE — 99349 HOME/RES VST EST MOD MDM 40: CPT

## 2024-11-25 ENCOUNTER — RX RENEWAL (OUTPATIENT)
Age: 74
End: 2024-11-25

## 2025-01-29 ENCOUNTER — APPOINTMENT (OUTPATIENT)
Dept: GERIATRICS | Facility: HOME HEALTH | Age: 75
End: 2025-01-29
Payer: MEDICARE

## 2025-01-29 VITALS
TEMPERATURE: 98 F | SYSTOLIC BLOOD PRESSURE: 130 MMHG | OXYGEN SATURATION: 97 % | DIASTOLIC BLOOD PRESSURE: 80 MMHG | HEART RATE: 82 BPM

## 2025-01-29 DIAGNOSIS — C43.59 MALIGNANT MELANOMA OF OTHER PART OF TRUNK: ICD-10-CM

## 2025-01-29 DIAGNOSIS — R22.32 LOCALIZED SWELLING, MASS AND LUMP, LEFT UPPER LIMB: ICD-10-CM

## 2025-01-29 DIAGNOSIS — C44.529 SQUAMOUS CELL CARCINOMA OF SKIN OF OTHER PART OF TRUNK: ICD-10-CM

## 2025-01-29 DIAGNOSIS — F03.90 UNSPECIFIED DEMENTIA W/OUT BEHAVIORAL DISTURBANCE: ICD-10-CM

## 2025-01-29 PROCEDURE — 99349 HOME/RES VST EST MOD MDM 40: CPT

## 2025-02-05 ENCOUNTER — NON-APPOINTMENT (OUTPATIENT)
Age: 75
End: 2025-02-05

## 2025-04-21 ENCOUNTER — NON-APPOINTMENT (OUTPATIENT)
Age: 75
End: 2025-04-21

## 2025-04-22 ENCOUNTER — TRANSCRIPTION ENCOUNTER (OUTPATIENT)
Age: 75
End: 2025-04-22

## 2025-04-23 ENCOUNTER — APPOINTMENT (OUTPATIENT)
Dept: GERIATRICS | Facility: HOME HEALTH | Age: 75
End: 2025-04-23
Payer: MEDICARE

## 2025-04-23 VITALS
TEMPERATURE: 96.7 F | HEART RATE: 62 BPM | DIASTOLIC BLOOD PRESSURE: 70 MMHG | OXYGEN SATURATION: 95 % | SYSTOLIC BLOOD PRESSURE: 115 MMHG | WEIGHT: 155 LBS | BODY MASS INDEX: 22.56 KG/M2

## 2025-04-23 DIAGNOSIS — R50.9 FEVER, UNSPECIFIED: ICD-10-CM

## 2025-04-23 DIAGNOSIS — C43.59 MALIGNANT MELANOMA OF OTHER PART OF TRUNK: ICD-10-CM

## 2025-04-23 DIAGNOSIS — N39.42 INCONTINENCE W/OUT SENSORY AWARENESS: ICD-10-CM

## 2025-04-23 DIAGNOSIS — F03.90 UNSPECIFIED DEMENTIA W/OUT BEHAVIORAL DISTURBANCE: ICD-10-CM

## 2025-04-23 PROCEDURE — 99349 HOME/RES VST EST MOD MDM 40: CPT

## 2025-04-25 ENCOUNTER — LABORATORY RESULT (OUTPATIENT)
Age: 75
End: 2025-04-25

## 2025-06-18 ENCOUNTER — APPOINTMENT (OUTPATIENT)
Dept: PULMONOLOGY | Facility: CLINIC | Age: 75
End: 2025-06-18

## 2025-07-16 ENCOUNTER — APPOINTMENT (OUTPATIENT)
Dept: GERIATRICS | Facility: HOME HEALTH | Age: 75
End: 2025-07-16
Payer: MEDICARE

## 2025-07-16 VITALS
HEART RATE: 53 BPM | SYSTOLIC BLOOD PRESSURE: 115 MMHG | DIASTOLIC BLOOD PRESSURE: 70 MMHG | OXYGEN SATURATION: 100 % | TEMPERATURE: 97.5 F

## 2025-07-16 PROCEDURE — 99349 HOME/RES VST EST MOD MDM 40: CPT
